# Patient Record
Sex: FEMALE | Race: WHITE
[De-identification: names, ages, dates, MRNs, and addresses within clinical notes are randomized per-mention and may not be internally consistent; named-entity substitution may affect disease eponyms.]

---

## 2018-06-25 NOTE — NUR
06/25/18 1437 Nat Gill 1431 PT ARRIVED IN PACU SLEEPY WITH NO C/O'S. OXYGEN DECREASED TO
2L VIA NC WITH SATS 96%.

## 2018-06-25 NOTE — OR
[facility]
                                    [address], [place]  [address]
_________________________________________________________________________________________
                                                                 Draft    
 
 
DATE OF OPERATION:
06/25/2018
 
SURGEON:
Ana Whipple MD
 
PREOPERATIVE DIAGNOSES:
Longstanding gastroesophageal reflux symptoms and known York's esophagus without
dysplasia (last upper endoscopy 2016). 
 
POSTOPERATIVE DIAGNOSES:
1. Minimal esophageal York's at distal portion.
2. Marginal flap valve.
3. Antral gastritis with erosive changes.
 
PROCEDURE PERFORMED:
Esophagogastroduodenoscopy with biopsy.
 
ANESTHESIA:
Intravenous sedation with fentanyl 100 mcg and Versed 3 mg.
 
INDICATIONS FOR PROCEDURE:
This 62-year-old white woman was well-known to me from the past.  She is a patient of
Dr. Mackey.  She has longstanding reflux symptoms and known York's esophagus.  Her
last upper endoscopy was in 2016, at which time no signs of dysplasia related to the
York's esophagus was noted.  She is having no dysphagia or reflux symptoms.  She is
taking Protonix.  She is anticipating right thyroid lobectomy later this week for an 18
mm right lower pole nodule.  Surveillance upper endoscopy was anticipated today
regarding the York's esophagus.  The risks of bleeding, infection, and perforation
were reviewed with her.  She understands and wishes to proceed. 
 
FINDINGS:
There is a very minimal York's epithelium, essentially a small island just above the
Z-line distally.  Whether this represents a sign of progression of her York's or not
is uncertain.  I will need to look at her previous photos to know that. 
 
The stomach itself showed erosive changes of the antral portion of the stomach, but no
sign of ulcer proper.  Duodenum was normal.  CLOtest was negative.  There is a poor flap
valve, but no sign of large hiatal hernia. 
 
DESCRIPTION OF PROCEDURE:
The patient was brought to the endoscopy suite, given topical Hurricaine spray
 
                                                                                    
_________________________________________________________________________________________
PATIENT NAME:     KATERINE DUNLAP                   
MEDICAL RECORD #: [id number]            OPERATIVE REPORT              
          ACCT #: [id number]  
DATE OF BIRTH:   [birth year]            REPORT #: [id number]      
PHYSICIAN:        ANA WHIPPLE MD                 
PCP:              HOSEA MACKEY DO            
REPORT IS CONFIDENTIAL AND NOT TO BE RELEASED WITHOUT AUTHORIZATION
 
 
                                  [facility]
                                    [address], [place]  [address]
_________________________________________________________________________________________
                                                                 Draft    
 
 
hypopharyngeal anesthesia and placed in lateral decubitus position.  She was given
intravenous sedation to the point of slurred speech and nystagmus.  A bite block was
placed.  The Olympus video upper endoscope was passed in the hypopharynx, vocal cords
appeared normal.  The scope was advanced to the esophagus without problem; throughout
its length, it was normal until the distal portion where a small island of what appeared
to be glandular mucosa was noted just above the Z-line.  This would indicate rather
minimal York's esophagus actually.  The scope was then passed to the stomach, which
was insufflated with air.  Rugal folds were normal.  The antrum had a surprising amount
of inflammation with erosive changes.  The pylorus was not distorted and was traversed
with the scope allowing for good visualization of the duodenum.  Duodenal biopsies were
obtained.  The scope was withdrawn and antral biopsies were then obtained of the antral
gastritis with erosive change.  The scope was withdrawn.  Retroflexed view undertaken
showed an attenuated flap valve, but no sign of large hiatal hernia.  Scope was
straightened, withdrawn, and biopsies were then taken of the island mucosa designated as
York's epithelium.  There is an impressively small amount of York's overall.  The
scope was further withdrawn to the mid esophagus, where biopsies were obtained as well.
The scope was removed and the patient was taken to recovery room in good condition. 
 
CONCLUDING DIAGNOSES:
Her York's esophagus is rather minimal in extent.  Biopsies likely will be normal as
regard to dysplasia or not.  She continues to take Protonix, which under the
circumstances is reasonable. 
 
She is anticipating right thyroid lobectomy, possible total thyroidectomy this Thursday.
 We will proceed on that plan. 
 
 
 
            ________________________________________
            MD DESTINI Puckett/ARGENTINAL
Job #:  [id number]
DD:  06/25/2018 14:37:49
DT:  06/25/2018 15:25:45
 
cc:            Hosea Mackey DO
 
 
Copies:  HOSEA MACKEY DO
 
 
                                                                                    
_________________________________________________________________________________________
PATIENT NAME:     KATERINE DUNLAP                   
MEDICAL RECORD #: [id number]            OPERATIVE REPORT              
          ACCT #: [id number]  
DATE OF BIRTH:   [birth year]            REPORT #: [id number]      
PHYSICIAN:        ANA WHIPPLE MD                 
PCP:              HOSEA MACKEY DO            
REPORT IS CONFIDENTIAL AND NOT TO BE RELEASED WITHOUT AUTHORIZATION
 
 
                                  [address]
                                  [place], [place]  [address]
_________________________________________________________________________________________
                                                                 Draft    
 
 
~
 
 
 
 
 
 
 
 
 
 
 
 
 
 
 
 
 
 
 
 
 
 
 
 
 
 
 
 
 
 
 
 
 
 
 
 
 
 
 
 
 
 
                                                                                    
_________________________________________________________________________________________
PATIENT NAME:     KATERINE DUNLAP                   
MEDICAL RECORD #: [id number]            OPERATIVE REPORT              
          ACCT #: [id number]  
DATE OF BIRTH:   [birth year]            REPORT #: [id number]      
PHYSICIAN:        ANA WHIPPLE MD                 
PCP:              HOSEA MACKEY DO            
REPORT IS CONFIDENTIAL AND NOT TO BE RELEASED WITHOUT AUTHORIZATION

## 2018-06-29 NOTE — OR
[facility]
                                    [address], [place]  [address]
_________________________________________________________________________________________
                                                                 Signed   
 
 
DATE OF OPERATION:
06/28/2018
 
SURGEON:
Ana Whipple MD
 
PREOPERATIVE DIAGNOSIS:
An 18 mm right lower pole thyroid nodule.
 
POSTOPERATIVE DIAGNOSIS:
An 18 mm right lower pole thyroid nodule with frozen pathology "follicular lesion
uniform." 
 
PROCEDURE:
Right thyroid lobectomy with isthmusectomy.
 
ANESTHESIA:
General endotracheal, Ana Arenas CRNA.
 
INDICATION:
This 62-year-old white woman is a patient Dr. Mackey and has been followed by me for a
right thyroid lobe nodule, which was between 17 and 18 mm in size.  There were three
nodules, 2 considered between the upper and lower pole on the right side and a very
small one on the left.  I had recommended a fine-needle aspiration biopsy by image
guidance in the past as the lesion is not strictly palpable, but she had declined.  She
was seen once again with the nodule unchanged essentially.  She has no associated
symptoms.  She has no family history of thyroid cancer, nor history of radiation therapy
or excessive risk for thyroid cancer in anyway. 
 
I have reviewed with her options of management including continued observation with
imaging studies, image guided fine-needle aspiration biopsy and surgical excision
including right thyroid lobectomy.  She strongly prefers the latter for a variety of
reasons.  Understands the risks of bleeding, infection, and nerve injury, she wished to
proceed. 
 
FINDINGS:
The thyroid lobe was relatively small actually.  The nodule itself took up the lower 1/4
of the gland and was a firm, but not hard nodule.  The right thyroid lobe was excised
fully.  Visualization of the recurrent laryngeal nerve was noted showing no sign of
injury to it.  Two probable parathyroids were identified, none were resected with the
specimen so far as could be told.  The isthmus was excised as well.  Frozen pathology by
Dr. Milan Rodney showed a "follicular lesion" with uniform cells and no capsular
 
    Electronically Signed By: ANA WHIPPLE MD  06/29/18 0915
_________________________________________________________________________________________
PATIENT NAME:     KATERINE DUNLAP                   
MEDICAL RECORD #: [id number]            OPERATIVE REPORT              
          ACCT #: [id number]  
DATE OF BIRTH:   [birth year]            REPORT #: [id number]      
PHYSICIAN:        ANA WHIPPLE MD                 
PCP:              HOSEA MACKEY DO            
REPORT IS CONFIDENTIAL AND NOT TO BE RELEASED WITHOUT AUTHORIZATION
 
 
                                  [facility]
                                    [address], [place]  [address]
_________________________________________________________________________________________
                                                                 Signed   
 
 
invasion.  Final pathology is pending. 
 
DESCRIPTION OF PROCEDURE:
The patient was brought to the operating room, given a general endotracheal anesthetic.
A Loving catheter was placed.  Preoperative antibiotic Ancef was given.  A shoulder roll
was placed and mild neck extension undertaken and esme longue position was maintained.
 The neck was prepared with a chlorhexidine solution and draped sterilely.  A natural
skin crease was marked and incision made between the medial heads of the
sternocleidomastoid muscle.  Dissection was carried through the dermis sharply.
Subcutaneous tissue and platysmal layers were divided with electrocautery.  Superior and
inferior flaps were developed with blunt and electrocautery dissection.  Two Gelpi
retractors were placed and the avascular plane in the midline was incised and the strap
muscles elevated down to the isthmus and trachea itself.  Retraction of the strap
muscles to the right side was undertaken using sharp dissection.  The thyroid lobe was
easily identified.  Palpation revealed a nodule in the lower pole, which was rather
firm, though not hard and with no associated calcifications.  Using sharp and blunt
dissection, the loose areolar tissue lateral, superior, and inferior to the gland was
freed up.  A small venous branches laterally were secured with 4-0 silk ties and
divided.  The superior pole was similarly mobilized and individual ligation of the blood
vessels undertaken avoiding mass closure of the upper pole.  A few clips were applied as
well in areas that were not easily secured with ties.  Similar dissection was taken in
the inferior aspect. 
 
The thyroid was progressively dissected free rolling it from a lateral to medial
position.  Posterior thyroid elements were identified as was parathyroid glands.  An
extracapsular technique of Escalera was used to free the gland.  Minimal electrocautery
was used to transect the ligament of Viera rolling the thyroid completely to the midline
to the avascular plane.  The isthmus was divided with electrocautery, flushed with the
left thyroid lobe.  The nodule in question was secured with a silk tie and passed for
frozen pathology. 
 
Examination of the retrotracheal structures showed the recurrent laryngeal nerve well
out of the way and out of harms.  A small amount of Kevin was applied to the parenchyma
of the thyroid (ligament of Berry area).  The wound was then closed by reapproximation
with interrupted 2-0 Vicryl suture of the strap muscles and interrupted 2-0 Vicryl of
the platysmal muscles.  The skin was closed with running subcuticular 4-0 Vicryl.
Steri-Strips were applied as a Mepilex silver sponge dressing.  At this point, the
frozen pathology report returned from Dr. Rodney, which included only "follicular lesion"
not specified as malignant or benign, but uniformity of the lesion was noted and no
capsular invasion was seen on the specimen examined.  The patient was allowed to emerge
from anesthesia, extubated and transferred to recovery in good condition and suffered no
complications.  Sponge, needle, and instruments counts reported as correct x3. 
 
    Electronically Signed By: ANA WHIPPLE MD  06/29/18 0915
_________________________________________________________________________________________
PATIENT NAME:     KATERINE DUNLAPN                   
MEDICAL RECORD #: [id number]            OPERATIVE REPORT              
          ACCT #: [id number]  
DATE OF BIRTH:   [birth year]            REPORT #: [id number]      
PHYSICIAN:        ANA WHIPPLE MD                 
PCP:              HOSEA MACKEY DO            
REPORT IS CONFIDENTIAL AND NOT TO BE RELEASED WITHOUT AUTHORIZATION
 
 
                                  [address] [place], [place]  [address]
_________________________________________________________________________________________
                                                                 Signed   
 
 
 
 
 
            ________________________________________
            MD DESTINI Puckett/ERNESTINA
Job #:  [id number]
DD:  06/28/2018 09:31:54
DT:  06/28/2018 10:49:30
 
cc:            Hosea Mackey DO
 
 
Copies:  HOSEA MACKEY DO
~
 
 
 
 
 
 
 
 
 
 
 
 
 
 
 
 
 
 
 
 
 
 
 
 
 
 
    Electronically Signed By: ANA WHIPPLE MD  06/29/18 0915
_________________________________________________________________________________________
PATIENT NAME:     KATERINE DUNLAP                   
MEDICAL RECORD #: [id number]            OPERATIVE REPORT              
          ACCT #: [id number]  
DATE OF BIRTH:   [birth year]            REPORT #: [id number]      
PHYSICIAN:        ANA WHIPPLE MD                 
PCP:              HOSEA MACKEY DO            
REPORT IS CONFIDENTIAL AND NOT TO BE RELEASED WITHOUT AUTHORIZATION

## 2020-03-29 ENCOUNTER — HOSPITAL ENCOUNTER (EMERGENCY)
Dept: HOSPITAL 46 - ED | Age: 65
LOS: 1 days | Discharge: HOME | End: 2020-03-30
Payer: MEDICARE

## 2020-03-29 VITALS — BODY MASS INDEX: 34.55 KG/M2 | WEIGHT: 195 LBS | HEIGHT: 63 IN

## 2020-03-29 DIAGNOSIS — Z79.899: ICD-10-CM
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
## 2020-03-29 DIAGNOSIS — Z79.82: ICD-10-CM

## 2020-03-29 DIAGNOSIS — Z90.710: ICD-10-CM

## 2020-03-29 DIAGNOSIS — S83.91XA: Primary | ICD-10-CM

## 2020-03-29 DIAGNOSIS — Z90.49: ICD-10-CM

## 2020-03-29 DIAGNOSIS — Z88.5: ICD-10-CM

## 2020-03-29 DIAGNOSIS — Z23: ICD-10-CM

## 2020-03-29 DIAGNOSIS — F17.200: ICD-10-CM

## 2021-07-16 ENCOUNTER — HOSPITAL ENCOUNTER (OUTPATIENT)
Dept: HOSPITAL 46 - DS | Age: 66
Discharge: HOME | End: 2021-07-16
Attending: SURGERY
Payer: MEDICARE

## 2021-07-16 VITALS — HEIGHT: 68 IN | BODY MASS INDEX: 29.55 KG/M2 | WEIGHT: 195 LBS

## 2021-07-16 DIAGNOSIS — R19.7: ICD-10-CM

## 2021-07-16 DIAGNOSIS — K21.9: ICD-10-CM

## 2021-07-16 DIAGNOSIS — K22.70: Primary | ICD-10-CM

## 2021-07-16 DIAGNOSIS — Z88.5: ICD-10-CM

## 2021-07-16 DIAGNOSIS — J43.9: ICD-10-CM

## 2021-07-16 DIAGNOSIS — D34: ICD-10-CM

## 2021-07-16 DIAGNOSIS — Z85.3: ICD-10-CM

## 2021-07-16 DIAGNOSIS — F32.9: ICD-10-CM

## 2021-07-16 DIAGNOSIS — F17.210: ICD-10-CM

## 2021-07-16 DIAGNOSIS — K29.80: ICD-10-CM

## 2021-07-16 DIAGNOSIS — E66.9: ICD-10-CM

## 2021-07-16 PROCEDURE — G0500 MOD SEDAT ENDO SERVICE >5YRS: HCPCS

## 2021-07-16 PROCEDURE — 0DBP8ZX EXCISION OF RECTUM, VIA NATURAL OR ARTIFICIAL OPENING ENDOSCOPIC, DIAGNOSTIC: ICD-10-PCS | Performed by: SURGERY

## 2021-07-16 PROCEDURE — 0DB98ZX EXCISION OF DUODENUM, VIA NATURAL OR ARTIFICIAL OPENING ENDOSCOPIC, DIAGNOSTIC: ICD-10-PCS | Performed by: SURGERY

## 2021-07-16 PROCEDURE — 0DB38ZX EXCISION OF LOWER ESOPHAGUS, VIA NATURAL OR ARTIFICIAL OPENING ENDOSCOPIC, DIAGNOSTIC: ICD-10-PCS | Performed by: SURGERY

## 2021-07-19 NOTE — OR
[facility]
                                    [address], [place]  [address]
_________________________________________________________________________________________
                                                                 Signed   
 
 
DATE OF OPERATION:
07/16/2021
 
SURGEON:
Ana Whipple MD
 
PREOPERATIVE DIAGNOSES:
1. History of York's esophagus, stricture and reflux.
2. Diarrhea, unknown etiology.
 
POSTOPERATIVE DIAGNOSES:
1. Minimal distal York's esophagus, minimal duodenitis.
2. Normal-appearing colon without specific intubation of the cecum.
 
PROCEDURES:
1. Esophagogastroduodenoscopy with biopsy.
2. Total colonoscopy to cecum with biopsies.
 
ANESTHESIA:
Intravenous sedation, fentanyl 200 mcg, and Versed 8 mg.
 
INDICATION:
This 66-year-old white woman is a patient of Oumou Booth, well known to me from the
past.  She is known to have reflux disease and has undergone upper endoscopy and
dilation by me for dysphagia in 2012.  Her last upper endoscopy was in 2018.  She is
noted to have minimal amounts of York's esophagus.  Her last colonoscopy was in 2014.
 She now has diarrhea.  Her reflux type symptoms have resolved.  She has no dysphagia.
She is admitted to undergo upper endoscopy and colonoscopy.  She understands the risks
of bleeding, infection, and perforation. 
 
FINDINGS:
Upper endoscopy did show minimal islands of York's esophagus in the distal esophagus.
 There was no evidence of stricture.  She has a reasonably good flap valve.  The stomach
and duodenum were normal.  CLOtest was negative. 
 
Colonoscopy showed a good prep.  Passage to fully intubate the cecum was not possible
based on the patient discomfort, but the colon that was visualized showed no evidence of
obvious colitis or other lesion.  Biopsies were taken to assess for occult colitis. 
 
DESCRIPTION OF PROCEDURE:
The patient was brought to the endoscopy suite and placed in lateral decubitus position,
given intravenous sedation to the point of slurred speech and nystagmus.  Lidocaine
 
    Electronically Signed By: ANA WHIPPLE MD  07/19/21 1830
_________________________________________________________________________________________
PATIENT NAME:     KATERINE DUNLAP                   
MEDICAL RECORD #: [id number]            OPERATIVE REPORT              
          ACCT #: [id number]  
DATE OF BIRTH:   [birth year]            REPORT #: [id number]      
PHYSICIAN:        ANA WHIPPLE MD                 
PCP:              OUMOU BOOTH PA-C         
REPORT IS CONFIDENTIAL AND NOT TO BE RELEASED WITHOUT AUTHORIZATION
 
 
                                  [facility]
                                    [address], [place]  [address]
_________________________________________________________________________________________
                                                                 Signed   
 
 
hypopharyngeal topical anesthesia had been administered.  A bite block was placed.  An
Olympus video upper endoscope was passed in the hypopharynx.  The vocal cords were
normal.  Scope was advanced to the esophagus throughout its length, it appeared normal
except in the distal portion, where there were few small islands of York's
epithelium.  The scope was passed to the stomach, which was insufflated with air.  Rugal
folds were normal.  There was mild chronic antral gastritis, but not much and certainly
no ulcerations.  Pylorus was normal, scope was passed through into the duodenum, which
was remarkable only for minimal duodenitis of the bulb.  Biopsies were obtained in this
area and biopsies additionally obtained of the stomach for CLOtest.  Inadequate
specimens were noted for antral biopsy.  Proper retroflexed view showed a reasonably
good flap valve.  No sign of large hiatal hernia.  Scope was straightened, withdrawn,
and multiple biopsies taken from the distal esophagus.  The minimal specimens were
obtained.  It was later found that an alligator type forcep had been substituted for a
conventional cup biopsy, plus accounting for inadequate biopsies unfortunately. 
 
The scope was withdrawn and a few concentric rings were noted in the mid esophagus.
Biopsies were obtained there with minimal specimens once again for the same reason.  The
scope was removed. 
 
Plans were made for colonoscopy.  Digital rectal examination showed no abnormality.  An
Olympus video colonoscope was passed in the rectum and manipulated throughout the colon,
showing a very good bowel prep, but impressively redundant colon.  Scope was advanced
beyond hepatic flexure to the right colon, but despite efforts made to intubation the
cecum cannot be certain.  The biopsy forceps was used to elevate the mucosa and likely
was the cecum showing no clear evidence of abnormality there.  The scope was then
withdrawn and the patient could tolerate no more manipulation to account for intubation
of the cecum despite abdominal wall stabilization and additional sedation.  Careful
withdrawal of scope showed no sign of abnormality.  Biopsies were taken of the rectum to
assess for occult colitis.  The scope was removed after retroflexed view was normal as
well the scope.  The patient was then taken to recovery room in good condition having
suffered no known complications. 
 
CONCLUSION DIAGNOSIS:
Upper endoscopy showing mild chronic duodenitis.  CLOtest, which was negative at 30
minutes post procedure and minimal islands of York's esophagus without evidence of
stricture or other abnormality. 
 
On colonoscopy, it appeared to have normal mucosa.  Biopsies were obtained to assess for
occult colitis. 
 
PLAN:
We will initiate Metamucil as an assist to her diarrhea type problem.  We will see her
 
    Electronically Signed By: ANA WHIPPLE MD  07/19/21 4966
_________________________________________________________________________________________
PATIENT NAME:     KATERINE DUNLAP                   
MEDICAL RECORD #: [id number]            OPERATIVE REPORT              
          ACCT #: [id number]  
DATE OF BIRTH:   [birth year]            REPORT #: [id number]      
PHYSICIAN:        ANA WHIPPLE MD                 
PCP:              OUMOU BOOTH PA-C         
REPORT IS CONFIDENTIAL AND NOT TO BE RELEASED WITHOUT AUTHORIZATION
 
 
                                  [address]  [address]
_________________________________________________________________________________________
                                                                 Signed   
 
 
back in a month or so and assess her progress and review her pathology reports as
available.  If Metamucil is ineffective in assistance with her diarrhea problem, other
interventions will be undertaken, possibly include Questran or even a motility agent
such as loperamide. 
 
 
 
            ________________________________________
            MD DESTINI Puckett/ERNESTINA
Job #:  [id number]
DD:  07/16/2021 10:39:23
DT:  07/16/2021 18:53:33
 
cc:            SOHAM Jaime
 
 
Copies:                                
~
 
 
 
 
 
 
 
 
 
 
 
 
 
 
 
 
 
 
 
 
 
 
    Electronically Signed By: ANA WHIPPLE MD  07/19/21 1830
_________________________________________________________________________________________
PATIENT NAME:     KATERINE DUNLAP                   
MEDICAL RECORD #: [id number]            OPERATIVE REPORT              
          ACCT #: [id number]  
DATE OF BIRTH:   [birth year]            REPORT #: [id number]      
PHYSICIAN:        ANA WHIPPLE MD                 
PCP:              OUMOU BOOTH PA-C         
REPORT IS CONFIDENTIAL AND NOT TO BE RELEASED WITHOUT AUTHORIZATION

## 2021-07-19 NOTE — PATH
[facility]
                                    [address], [place]  [address]
_________________________________________________________________________________________
                                                                 Signed   
 
 
 
SPECIMEN(S): A DUODENAL BIOPSY
SPECIMEN(S): B LOWER ESOPHAGEAL BIOPSY
SPECIMEN(S): C RECTUM
 
SPECIMEN SOURCE:
A. DUODENAL BIOPSY
B. LOWER ESOPHAGEAL BIOPSY
C. RECTUM
 
CLINICAL HISTORY:
Esophagogastroduodenoscopy/colonoscopy.  GERD/York's/diarrhea.  Post: 1) 
minimal York's esophagus; 2) normal appearing colon. 
MICROSCOPIC DESCRIPTION:
Histologic sections of all submitted blocks are examined by light microscopy. 
These findings, together with the gross examination, support the pathologic 
diagnosis. 
 
FINAL PATHOLOGIC DIAGNOSIS:
A.  Duodenum, biopsy:
-  Scant fragment of denuded small-bowel mucosa with no significant pathologic 
changes. 
B.  Esophagus, lower, biopsy:
-  Esophageal squamous mucosa with reactive epithelial changes.
-  No glandular mucosa present.
C.  Rectum, biopsy:
-  Colonic mucosa with no significant pathologic changes.
BRP:[facility]:[id number]
 
GROSS DESCRIPTION:
Three specimens are received in three containers, labeled "CE."
A.  The specimen, labeled "CE, duodenum biopsy," is received in formalin and 
consists of one tan soft tissue fragment that measures less than 0.1 cm in 
greatest dimension.  The specimen is entirely 
submitted in cassette (A1).
B.  The specimen, labeled "CE, distal esophagus," is received in formalin and 
consists of one tan soft tissue fragment that measures 0.1 cm in greatest 
dimension.  The specimen is entirely submitted 
in cassette (B1).
C.  The specimen, labeled "CE, rectum biopsy," is received in formalin and 
consists of two tan soft tissue fragments that measure 0.1-0.2 cm in greatest 
dimension.  The specimen is entirely submitted 
 
                                                                                    
_________________________________________________________________________________________
PATIENT NAME:     KATERINE DUNLAP                   
MEDICAL RECORD #: [id number]            PATHOLOGY                     
          ACCT #: [id number]       ACCESSION #: [id number]     
DATE OF BIRTH:   [birth year]            REPORT #: [id number]       
PHYSICIAN:        ZHAO SEVILLA              
PCP:              OUMOU FRENCH PA-C         
REPORT IS CONFIDENTIAL AND NOT TO BE RELEASED WITHOUT AUTHORIZATION
 
 
                                  [facility]
                                    [address], [place]  [address]
_________________________________________________________________________________________
                                                                 Signed   
 
 
in cassette (C1).
JS (under the direct supervision of a pathologist)
The Gross Description was prepared using a voice recognition system. The report 
was reviewed for accuracy; however, sound-alike word errors, addition and/or 
deletions may occur. If there is any 
 
question about this report, please contact Client Services.
 
PERFORMING LABORATORY:
The technical component was performed by [email or web address], [address] [place] [id number] (Medical Director: Alma Hidalgo MD; CLIA# [id number]). 
[email or web address], [address] [place] [place] [address]. 
 
Diagnostician:  Alexander Guadarrama MD
Pathologist
Electronically Signed 07/19/2021
 
 
Copies:                                
~
 
 
 
 
 
 
 
 
 
 
 
 
 
 
 
 
 
 
 
 
 
 
                                                                                    
_________________________________________________________________________________________
PATIENT NAME:     KATERINE DUNLAP                   
MEDICAL RECORD #: [id number]            PATHOLOGY                     
          ACCT #: [id number]       ACCESSION #: [id number]     
DATE OF BIRTH:   [birth year]            REPORT #: [id number]       
PHYSICIAN:        ZHAO SEVILLA              
PCP:              OUMOU FRENCH PA-C         
REPORT IS CONFIDENTIAL AND NOT TO BE RELEASED WITHOUT AUTHORIZATION

## 2022-08-17 ENCOUNTER — HOSPITAL ENCOUNTER (EMERGENCY)
Dept: HOSPITAL 46 - ED | Age: 67
LOS: 1 days | Discharge: HOME | End: 2022-08-18
Payer: MEDICARE

## 2022-08-17 VITALS — BODY MASS INDEX: 33.95 KG/M2 | WEIGHT: 191.58 LBS | HEIGHT: 63 IN

## 2022-08-17 DIAGNOSIS — L02.512: ICD-10-CM

## 2022-08-17 DIAGNOSIS — Z88.5: ICD-10-CM

## 2022-08-17 DIAGNOSIS — Z79.82: ICD-10-CM

## 2022-08-17 DIAGNOSIS — R73.03: ICD-10-CM

## 2022-08-17 DIAGNOSIS — Z79.899: ICD-10-CM

## 2022-08-17 DIAGNOSIS — S60.562A: Primary | ICD-10-CM

## 2022-08-17 DIAGNOSIS — F17.200: ICD-10-CM
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
## 2022-08-17 DIAGNOSIS — Z79.84: ICD-10-CM

## 2022-08-17 DIAGNOSIS — L03.114: ICD-10-CM
